# Patient Record
Sex: FEMALE | Race: WHITE | NOT HISPANIC OR LATINO | Employment: UNEMPLOYED | URBAN - METROPOLITAN AREA
[De-identification: names, ages, dates, MRNs, and addresses within clinical notes are randomized per-mention and may not be internally consistent; named-entity substitution may affect disease eponyms.]

---
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
## 2024-03-15 DIAGNOSIS — R55 SYNCOPE, UNSPECIFIED SYNCOPE TYPE: Primary | ICD-10-CM

## 2024-03-15 DIAGNOSIS — R94.31 NONSPECIFIC ABNORMAL ELECTROCARDIOGRAM (ECG) (EKG): ICD-10-CM

## 2024-03-25 ENCOUNTER — OFFICE VISIT (OUTPATIENT)
Dept: PEDIATRIC CARDIOLOGY | Facility: CLINIC | Age: 19
End: 2024-03-25
Payer: COMMERCIAL

## 2024-03-25 VITALS
SYSTOLIC BLOOD PRESSURE: 102 MMHG | HEIGHT: 65 IN | HEART RATE: 60 BPM | OXYGEN SATURATION: 99 % | BODY MASS INDEX: 19.45 KG/M2 | RESPIRATION RATE: 18 BRPM | DIASTOLIC BLOOD PRESSURE: 74 MMHG | WEIGHT: 116.75 LBS

## 2024-03-25 DIAGNOSIS — G90.1 DYSAUTONOMIA: Primary | ICD-10-CM

## 2024-03-25 DIAGNOSIS — R55 SYNCOPE, UNSPECIFIED SYNCOPE TYPE: ICD-10-CM

## 2024-03-25 DIAGNOSIS — R94.31 NONSPECIFIC ABNORMAL ELECTROCARDIOGRAM (ECG) (EKG): ICD-10-CM

## 2024-03-25 PROCEDURE — 3074F SYST BP LT 130 MM HG: CPT | Mod: CPTII,S$GLB,, | Performed by: PHYSICIAN ASSISTANT

## 2024-03-25 PROCEDURE — 3008F BODY MASS INDEX DOCD: CPT | Mod: CPTII,S$GLB,, | Performed by: PHYSICIAN ASSISTANT

## 2024-03-25 PROCEDURE — 1160F RVW MEDS BY RX/DR IN RCRD: CPT | Mod: CPTII,S$GLB,, | Performed by: PHYSICIAN ASSISTANT

## 2024-03-25 PROCEDURE — 1159F MED LIST DOCD IN RCRD: CPT | Mod: CPTII,S$GLB,, | Performed by: PHYSICIAN ASSISTANT

## 2024-03-25 PROCEDURE — 3078F DIAST BP <80 MM HG: CPT | Mod: CPTII,S$GLB,, | Performed by: PHYSICIAN ASSISTANT

## 2024-03-25 PROCEDURE — 93000 ELECTROCARDIOGRAM COMPLETE: CPT | Mod: S$GLB,,, | Performed by: PEDIATRICS

## 2024-03-25 PROCEDURE — 99213 OFFICE O/P EST LOW 20 MIN: CPT | Mod: 25,S$GLB,, | Performed by: PHYSICIAN ASSISTANT

## 2024-03-25 RX ORDER — NORETHINDRONE ACETATE AND ETHINYL ESTRADIOL AND FERROUS FUMARATE 5-7-9-7
1 KIT ORAL
COMMUNITY
Start: 2024-03-18

## 2024-03-25 NOTE — PROGRESS NOTES
[facility]  Sujatha Figueredo  2005    Sujatha Figueredo is a 18 y.o. female presenting for follow-up of    Syncope    Multiple sprains    Hypermobile joints    Joint dislocation    Dysautonomia    Seizure-like activity    Numbness    Stress    Nonspecific abnormal electrocardiogram (ECG) (EKG)       HPI  Sujatha Figueredo presented for evaluation of near syncope on 6/29/22.  Exam revealed: Grade 1/6 regurgitant murmur noted at the apex. HR 80 supine and 130 bpm supine. Her near syncopal episodes did have features of dysautonomia and protocol was reviewed. However, due to some of her episodes occurring supine and associated shaking of an arm and leg and intermittent numbness, she was refered  to Dr. Wheat for further evaluation (eval for seizure/pseudo seizure). 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  She was referred to genetics.  Echo and holter were ordered that were appropriate.      She saw Dr. Wheat 7/25/22. He suspected she has psychogenic nonepileptic events and could not rule out PTSD. He recommended further evaluation by psychiatrist or psychologist. She was asked to return PRN.      She was last seen 8/8/22. Her symptoms had improved in frequency. She had started biofeedback. No murmur noted exam. EKG: negative p waves in V1, good voltage, Nonspecific T waves, and Doubt LAE.  Dysautonomia protocol was reviewed. She was given a 3 month follow up. She is late for follow up.     Sujatha has been doing well since last visit.  She has had dizziness with blurry vision with positional changes but no syncope since last visit. She is  drinking mainly water.  Sujatha has a lot of energy and does not get short of breath with activity.  Denies any recent illness, surgeries, or hospitalizations.    There are no reports of chest pain, chest pain with exertion, cyanosis, exercise intolerance, dyspnea, fatigue, palpitations, syncope, and tachypnea. No other cardiovascular or medical concerns are reported.      Medications:   Medication List with Changes/Refills   Current Medications    CALCIUM CARB/MAGNESIUM HYDROX (MYLANTA ORAL)        NORETHINDRONE-ETHINYL ESTRADIOL-IRON (ESTROSTEP FE) 1-20(5)/1-30(7) /1MG-35MCG (9) TAB    Take 1 tablet by mouth.   Discontinued Medications    AMOXICILLIN (AMOXIL) 500 MG TAB    Take 500 mg by mouth 3 (three) times daily.      Allergies: Review of patient's allergies indicates:  No Known Allergies  Family History   Problem Relation Age of Onset    Anemia Mother     Mitral valve prolapse Father     Mitral valve prolapse Paternal Uncle     Myocarditis Paternal Uncle     Mitral valve prolapse Paternal Uncle     Hypertension Paternal Grandmother     Arrhythmia Paternal Grandfather     Cardiomyopathy Neg Hx     Childhood respiratory disease Neg Hx     Clotting disorder Neg Hx     Congenital heart disease Neg Hx     Deafness Neg Hx     Early death Neg Hx     Heart attacks under age 50 Neg Hx     Long QT syndrome Neg Hx     Pacemaker/defibrilator Neg Hx     Premature birth Neg Hx     Seizures Neg Hx     SIDS Neg Hx      Past Medical History:   Diagnosis Date    Depression     Dysautonomia     Hypermobile joints     Joint dislocation     Multiple sprains     Murmur 06/29/2022    Nonspecific abnormal electrocardiogram (ECG) (EKG)     Numbness     Seizure-like activity     Stress     Syncope      Social History     Social History Narrative    Anne-Marie lives with grandparents. Anne-Marie is going to be a freshmen in college this year. Anne-Marie likes to read and dance.      Past Surgical History:   Procedure Laterality Date    NO PAST  "SURGERIES       No birth history on file.    There is no immunization history on file for this patient.  Immunizations were reviewed today and if not current, recommend follow up with the PCP for further management.  Past medical history, family history, surgical history, social history updated and reviewed today.     Review of Systems  GENERAL: No fever, chills, fatigability, malaise, or weight loss.  CHEST: Denies QUILES, cyanosis, wheezing, cough, sputum production, or SOB.  CARDIOVASCULAR: Denies chest pain, palpitations, diaphoresis, SOB, or reduced exercise tolerance.  Endocrine: Denies polyphagia, polydipsia, or polyuria  Skin: Denies rashes or color change  HENT: Negative for congestion, headaches and sore throat.   ABDOMEN: Appetite fine. No weight loss. Denies diarrhea, abdominal pain, nausea, or vomiting.  PERIPHERAL VASCULAR: No edema, varicosities, or cyanosis.  Musculoskeletal: Negative for muscle weakness and stiffness.  NEUROLOGIC: +dizziness, no history of syncope by report, no headache   Psychiatric/Behavioral: Negative for altered mental status. The patient is not nervous/anxious.   Allergic/Immunologic: Negative for environmental allergies.   : dysuria, hematuria, polyuria    Objective:   /74 (BP Location: Right arm, Patient Position: Lying, BP Method: Medium (Manual))   Pulse 60   Resp 18   Ht 5' 4.96" (1.65 m)   Wt 53 kg (116 lb 11.7 oz)   SpO2 99%   BMI 19.45 kg/m²   Body surface area is 1.56 meters squared.  Growth %ile SmartLinks can only be used for patients less than 20 years old.    Physical Exam  GENERAL: Awake, well-developed well-nourished, no apparent distress  HEENT: mucous membranes moist and pink, normocephalic, no cranial or carotid bruits, sclera anicteric  NECK:  no lymphadenopathy  CHEST: Good air movement, clear to auscultation bilaterally  CARDIOVASCULAR: Quiet precordium, regular rate and rhythm, single S1, split S2, normal P2, No S3 or S4, no rubs or gallops. No " clicks or rumbles. No cardiomegaly by palpation. No murmur noted.   ABDOMEN: Soft, nontender nondistended, no hepatosplenomegaly, no aortic bruits  EXTREMITIES: Warm well perfused, 2+ radial/pedal/femoral pulses, capillary refill 2 seconds, no clubbing, cyanosis, or edema  NEURO: Alert and oriented, cooperative with exam, face symmetric, moves all extremities well.  Skin: pink, turgor WNL  Vitals reviewed     Tests:   Today's EKG interpretation by Dr. Graham reveals:   NSR  Doubt LAE  (Final report in electronic medical record)    Echocardiogram:   Pertinent echocardiographic findings from the echo dated 7/25/22 are:   4 chambers with normally aligned great vessels  Qualitatively normal chamber sizes  Good function  PVR not imaged well  (Full report in electronic medical record)     Holter 6/29/22   Conclusion  Sinus rhythm throughout.  HR Range:  (avg 78) bpm.  Patient-triggered events (20) correlate to sinus rhythm.  No significant ectopy burden.    Assessment:  Patient Active Problem List   Diagnosis    Syncope    Multiple sprains    Hypermobile joints    Joint dislocation    Dysautonomia    Nonspecific abnormal electrocardiogram (ECG) (EKG)       Discussion/ Plan:   I have reviewed our general guidelines related to cardiac issues with the family.  I instructed them in the event of an emergency to call 911 or go to the nearest emergency room.  They know to contact the PCP if problems arise or if they are in doubt.    We have discussed dysautonomia at length today. No syncope in several years. Discussed lifestyle changes to help with the dizziness. If her symptoms do not improve, she will call and will start Florinef.  Dysautonomia is a term used to describe a multitude of symptoms that can occur with dysfunction of the autonomic nervous system. The autonomic nervous system serves as the main communication link between the brain and the organs without conscious effort. There are different types of dysautonomia  including postural orthostatic tachycardia syndrome (POTS), orthostatic hypotension (OH), and myalgic encephalomyelitis (ME) which is also known as chronic fatigue syndrome (CFS). Dysautonomia causes many symptoms that vary from person to person and can range in severity.  Common symptoms include: severe dizziness and fainting, headaches, severe fatigue, difficulty with concentration, heat or cold intolerance, palpitations, chest pain, weakness, venous pooling, nausea, vomiting, abdominal discomfort, and joint/muscle pain.  In part, these symptoms can be managed with a combination of non-pharmacologic interventions, including ensuring adequate fluid and salt intake, not skipping meals, limiting caffeine, self-limiting activities, and medications. There is nothing magic that we can do to make all of the symptoms go away.  The hope is to reduce symptoms so that important things such as the activities of daily living and education may be easier. It is important to know that symptoms may vary from hour to hour, day to day, throughout the month (especially for females), and throughout the year. Symptoms may abruptly start and are sometimes triggered by illnesses such as mono or flu.  Different people can have different combinations of symptoms. It is important to keep a daily log including fluid intake and symptoms. Protocol and guidelines were reviewed and include no dark water swimming without a life vest, no clear water swimming without a life vest and/or strict  and/or adult supervision.  If syncope or presyncope is experienced, they are to resume a position of comfort, either sitting or laying down.  I also suggested they elevate their feet 6 inches above their head.      Dysautonomia symptoms can be controlled by using a combination of medications and nonpharmacologic treatments which include:  Drink 80+ ounces of fluid (tap water, Propel, Gatorade, G2, Powerade, Powerade zero, Splash) each day, and have a  salty snack (pretzels, saltines, pickles).    Dont skip meals. Recommend eating 5-6 small meals a day.  Avoid large meals that contain a lot of carbohydrates which may exacerbate your symptoms.   No caffeine (its a diuretic, so it makes you urinate and empty your tank of fluid)  Raise the head of your bed 4-6 inches on something firm to help reduce dizziness in the morning when you get up  Insomnia can be treated with good sleep habits:  Lower the lights one hour before bedtime  Do a relaxing activity, such as reading under low light, massage, meditation, yoga, stretching, or a warm bath.    Turn off the television, computer and video games, and stop cell phone use.  When it is time for bed, the room should be dark (no night lights) and cool, but not cold.  Avoid triggers that worsen symptoms:  Have a consistent bedtime and amount of sleep (10-14 hours for adolescents).  Avoid extreme heat or cold.  Avoid stressful situations if possible  Wear compression stockings (30-40 mmHg) which should extend from waist to toe. They should be worn during awake hours.  A cooling vest is a vest with gel inserts that can be cooled in the freezer, then inserted into the vest and worn when it is hot outside.  There are also evaporative cooling vests, as well.  Patients who cannot tolerate the heat often appreciate these.     Coping with a chronic disease is stressful.  Many families find it helpful to see a mental health provider.    Participating in exercise is critical to the successful management of dysautonomia, and to the long-term improvement and resolution of symptoms.  Start with a small amount of leg and core strengthening exercise, such as 5 minutes/day, and increasing by 5 minutes/day every week up to 30 to 60 minutes/day. Despite possible initial worsening of symptoms and decreased overall energy, we recommend to try to push through as best as possible.  If needed, you may take a two-day break, and then resume  exercise at a lower duration and/or intensity, and work back up to following the protocol. Again, this is a vital part of the program and is important for you to follow.  Failure to exercise regularly makes it difficult for us to help you manage your  symptoms and may contribute to ongoing problems and quality of life.      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 Will plan to refer to genetics again next year on return visit when hopefully [facility] will have a genetics team.     Her EKG is nonspecific and is likely a normal variant. Echo was [facility]. Will follow.     I spent a total of 20 minutes on the day of the visit.  This includes face to face time and non-face to face time preparing to see the patient (eg, review of tests), obtaining and/or reviewing separately obtained history, documenting clinical information in the electronic or other health record, independently interpreting results and communicating results to the patient/family/caregiver, or care coordinator.    Activity: She can participate in normal age-appropriate activities. She should be allowed to set .his own pace and rest if fatigued. If Sujatha becomes lightheaded or feels as if she may pass out, patient should assume a position of comfort immediately (sit down or lie down) until the feeling passes. Do not make them walk somewhere to sit down. Please allow the patient to drink 60-100oz of fluids (Gatorade/Powerade/tap water/Splash/Propel) and eat salty snacks throughout the day (both at home and at school) to minimize the likeliness of dizziness. Please allow frequent bathroom breaks due to increased fluid  intake. There should be no dark water swimming without a life vest and there should be no clear water swimming without adult or  supervision.      No endocarditis prophylaxis is recommended in this circumstance.      Medications:   Medication List with Changes/Refills   Current Medications    CALCIUM CARB/MAGNESIUM HYDROX (MYLANTA ORAL)        NORETHINDRONE-ETHINYL ESTRADIOL-IRON (ESTROSTEP FE) 1-20(5)/1-30(7) /1MG-35MCG (9) TAB    Take 1 tablet by mouth.   Discontinued Medications    AMOXICILLIN (AMOXIL) 500 MG TAB    Take 500 mg by mouth 3 (three) times daily.         Orders placed this encounter  Orders Placed This Encounter   Procedures    EKG 12-lead       Follow-Up:   Return to dysautonomia clinic in 1 year with EKG or sooner if there are any concerns    Sincerely,  Ag Graham MD    Note Contributing Authors:  MD Shannen Farrell PA-C  03/25/2024    Attestation: Ag Graham MD  I have reviewed the records and agree with the above.  I agree with the plan and the follow up instructions.

## 2024-03-25 NOTE — LETTER
"   [facility]  [address] [address]  Phone: [phone]  Fax: [phone]     03/25/2024  Patient Name: Sujatha Figueredo  YOB: 2005      To Whom It May Concern:    Sujatha Figueredo is a 18 y.o. year-old patient who is followed by me with a diagnosis of postural orthostatic tachycardia syndrome (POTS). POTS consists of significant dysfunction of the autonomic nervous system and includes varied symptoms, such as severe dizziness and fainting, headaches, severe fatigue, difficulty with concentration, heat or cold intolerance, palpitations and chest pain, weakness, and abdominal discomfort. In part, these symptoms can be managed with a combination of non-pharmacologic interventions, including ensuring adequate fluid and salt intake, not skipping meals, limiting caffeine, and self-limiting activities, as well as medications.  Unfortunately, however, the physical toll of school can sometimes exacerbate these symptoms. Children and teens with POTS often meet the requirements for a 504 plan, which provides protection from discrimination based on their medical disability. Some may also require an IEP or a partial school day. It is our goal to have these patients at school as much as they can tolerate.  Below are recommendations to consider as indicated to assist these students in their academic performance:  Unlimited access to water, or other fluids, and restroom use   Access to salty snacks outside of lunch   More time to transit between classes   Ability to go to the nurse for "as needed" medication administration   Accompaniment to the restroom and between classes with a buddy, as needed   Modification of homework assignments to allow for adequate rest at night   More time for testing   Having two sets of school texts (one for school, one for home)   Use of an elevator key   If excessively fatigued or dizzy, allow a 15-minute supervised break in cool " quiet environment, such as the library   Preferential seating should be allowed -- the child should be placed as close to the teacher and/or an exit, as possible   Prioritize core academics -- if a child does not have the energy for a full day, consider scheduling required classes together   Participation in an adaptive PE class   Forbearance for frequent tardiness and/or absence   Evaluation by the school counselor or psychologist on a weekly basis to assess the student's needs   Apprise the children of all school and after school activities, and allow the child to participate to their level of ability   Creativity, flexibility, communication, and understanding in the management of specific issues that may arise in the care of this child  Thank you very much for your assistance in this matter. Please feel free to call me with any further questions.  Sincerely yours,    MD Shannen Huynh PA-C

## 2024-03-25 NOTE — PATIENT INSTRUCTIONS
Ag Graham MD  Pediatric Cardiology  [address] [place] [address]  [phone]    General Guidelines    Name: Sujatha Xie Post                   : 2005    Diagnosis:   1. Syncope, unspecified syncope type    2. Nonspecific abnormal electrocardiogram (ECG) (EKG)        PCP: Karma Chisholm MD  PCP Phone Number: [phone]    If you have an emergency or you think you have an emergency, go to the nearest emergency room!     Breathing too fast, doesnt look right, consistently not eating well, your child needs to be checked. These are general indications that your child is not feeling well. This may be caused by anything, a stomach virus, an ear ache or heart disease, so please call Karma Chisholm MD. If Krama Chisholm MD thinks you need to be checked for your heart, they will let us know.     If your child experiences a rapid or very slow heart rate and has the following symptoms, call Karma Chisholm MD or go to the nearest emergency room.   unexplained chest pain   does not look right   feels like they are going to pass out   actually passes out for unexplained reasons   weakness or fatigue   shortness of breath  or breathing fast   consistent poor feeding     If your child experiences a rapid or very slow heart rate that lasts longer than 30 minutes call Karma Chisholm MD or go to the nearest emergency room.     If your child feels like they are going to pass out - have them sit down or lay down immediately. Raise the feet above the head (prop the feet on a chair or the wall) until the feeling passes. Slowly allow the child to sit, then stand. If the feeling returns, lay back down and start over.     It is very important that you notify Karma Chisholm MD first. Karma Chisholm MD or the ER Physician can reach Dr. Ag Graham at the office or through [facility] at [phone] as needed.    Call our office ([phone]) one week after ALL tests for  results.     We have discussed dysautonomia at length today. Dysautonomia is a term used to describe a multitude of symptoms that can occur with dysfunction of the autonomic nervous system. The autonomic nervous system serves as the main communication link between the brain and the organs without conscious effort. There are different types of dysautonomia including postural orthostatic tachycardia syndrome (POTS), orthostatic hypotension (OH), and myalgic encephalomyelitis (ME) which is also known as chronic fatigue syndrome (CFS). Dysautonomia causes many symptoms that vary from person to person and can range in severity.  Common symptoms include: severe dizziness and fainting, headaches, severe fatigue, difficulty with concentration, heat or cold intolerance, palpitations, chest pain, weakness, venous pooling, nausea, vomiting, abdominal discomfort, and joint/muscle pain.  In part, these symptoms can be managed with a combination of non-pharmacologic interventions, including ensuring adequate fluid and salt intake, not skipping meals, limiting caffeine, self-limiting activities, and medications. There is nothing magic that we can do to make all of the symptoms go away.  The hope is to reduce symptoms so that important things such as the activities of daily living and education may be easier. It is important to know that symptoms may vary from hour to hour, day to day, throughout the month (especially for females), and throughout the year. Symptoms may abruptly start and are sometimes triggered by illnesses such as mono or flu.  Different people can have different combinations of symptoms. It is important to keep a daily log including fluid intake and symptoms. Protocol and guidelines were reviewed and include no dark water swimming without a life vest, no clear water swimming without a life vest and/or strict  and/or adult supervision.  If syncope or presyncope is experienced, they are to resume a  position of comfort, either sitting or laying down.  I also suggested they elevate their feet 6 inches above their head.     Dysautonomia symptoms can be controlled by using a combination of medications and nonpharmacologic treatments which include:  Drink 80+ ounces of fluid (tap water, Propel, Gatorade, G2, Powerade, Powerade zero, Splash) each day, and have a salty snack (pretzels, saltines, pickles).    Dont skip meals. Recommend eating 5-6 small meals a day.  Avoid large meals that contain a lot of carbohydrates which may exacerbate your symptoms.   No caffeine (its a diuretic, so it makes you urinate and empty your tank of fluid)  Raise the head of your bed 4-6 inches on something firm to help reduce dizziness in the morning when you get up  Insomnia can be treated with good sleep habits:  Lower the lights one hour before bedtime  Do a relaxing activity, such as reading under low light, massage, meditation, yoga, stretching, or a warm bath.    Turn off the television, computer and video games, and stop cell phone use.  When it is time for bed, the room should be dark (no night lights) and cool, but not cold.  Avoid triggers that worsen symptoms:  Have a consistent bedtime and amount of sleep (10-14 hours for adolescents).  Avoid extreme heat or cold.  Avoid stressful situations if possible  Wear compression stockings (30-40 mmHg) which should extend from waist to toe. They should be worn during awake hours.  A cooling vest is a vest with gel inserts that can be cooled in the freezer, then inserted into the vest and worn when it is hot outside.  There are also evaporative cooling vests, as well.  Patients who cannot tolerate the heat often appreciate these.     Coping with a chronic disease is stressful.  Many families find it helpful to see a mental health provider.    Participating in exercise is critical to the successful management of dysautonomia, and to the long-term improvement and resolution of  symptoms.  Start with a small amount of leg and core strengthening exercise, such as 5 minutes/day, and increasing by 5 minutes/day every week up to 30 to 60 minutes/day. Despite possible initial worsening of symptoms and decreased overall energy, we recommend to try to push through as best as possible.  If needed, you may take a two-day break, and then resume exercise at a lower duration and/or intensity, and work back up to following the protocol. Again, this is a vital part of the program and is important for you to follow.  Failure to exercise regularly makes it difficult for us to help you manage your  symptoms and may contribute to ongoing problems and quality of life.

## 2024-03-25 NOTE — LETTER
[facility]  [address] [facility] [address]  Phone: [phone]  Fax: [phone]     Recommendations for Recreational Activity    2024    Name: Sujatha Figueredo                 : 2005    Diagnosis:   1. Syncope, unspecified syncope type    2. Nonspecific abnormal electrocardiogram (ECG) (EKG)      To Whom It May Concern:    Sujatha Figueredo was last seen in this office on 3/25/2024. I recommend, based on those clinical findings, that she should be allowed to set her own pace and to rest when fatigued.    If Sujatha Figueredo becomes lightheaded or feels as if she may pass out, she should assume a position of comfort immediately (sit down or lie down) until the feeling passes. Do not make her walk somewhere to sit down.     Please allow her to drink 80+ ounces of fluid (tap water, Propel, Gatorade, G2, Powerade, Powerade zero, Splash) and eat salty snacks throughout the day (both at home and at school) to minimize the likeliness of dizziness. Please allow frequent bathroom breaks due to increased fluid intake.    There should be no dark water swimming without a life vest and there should be no clear water swimming without adult or  supervision.    If you have any further questions, please do not hesitate to contact me.       MD Shannen Farrell PA-C

## 2024-03-25 NOTE — LETTER
[facility]  [address] [facility] [address]  Phone: [phone]  Fax: [phone]       2024    Name: Sujatha Figueredo                 : 2005    Diagnosis: dysautonomia    To Whom It May Concern:    I am writing a letter of support for a 504 on behalf of  Sujatha Figueredo, who has been diagnosed with postural orthostatic tachycardia syndrome (POTS). In POTS, the adolescent can suffer from a wide range of symptoms that are ultimately connected by the dysfunction of the autonomic automatic nervous system. Some symptoms are related to improper blood flow to the brain including dizziness, lightheadedness, headaches, and cognitive impairments (brain fog), and fainting. Adolescents with POTS may also develop insomnia, fatigue, neuropathic pain, light and/or noise sensitivity, abdominal pain, and chest pain among others.  Developing POTS is a game changer for this student and their family. While it is an invisible illness, the symptoms experienced can be life altering. A person with POTS uses three times more energy to stand than normal and even minor movements around the house, including eating meals and showering, can be exhausting and increase symptoms. The quality of life of a person with POTS has been compared to those with congestive heart failure or chronic obstructive pulmonary disease (COPD).  Because of this, frequent school absences are common for students with POTS. Like many others with POTS, Sujatha Figueredo may need accommodations to maintain her academic career. Modification or elimination of physical education classes and other efforts to minimize the number of steps taken throughout the school day is essential to prevent further fatigue. Please allow Sujatha Figueredo  access to fluids and salty snacks throughout the day, and try to minimize long periods of standing as this will exacerbate symptoms. In the case of extended absences,  please prioritize major assignments be flexible with deadlines. In some cases, a home  is also needed.  If you have any questions, please dont hesitate to contact me.  Sincerely,    MD Shannen Farrell PA-C

## 2024-03-26 LAB
OHS QRS DURATION: 80 MS
OHS QTC CALCULATION: 444 MS